# Patient Record
Sex: MALE | Race: OTHER | NOT HISPANIC OR LATINO | URBAN - METROPOLITAN AREA
[De-identification: names, ages, dates, MRNs, and addresses within clinical notes are randomized per-mention and may not be internally consistent; named-entity substitution may affect disease eponyms.]

---

## 2024-11-20 ENCOUNTER — OFFICE VISIT (OUTPATIENT)
Dept: URGENT CARE | Age: 59
End: 2024-11-20
Payer: MEDICAID

## 2024-11-20 VITALS
HEIGHT: 69 IN | OXYGEN SATURATION: 99 % | SYSTOLIC BLOOD PRESSURE: 138 MMHG | DIASTOLIC BLOOD PRESSURE: 75 MMHG | HEART RATE: 78 BPM | TEMPERATURE: 98 F | BODY MASS INDEX: 23.4 KG/M2 | RESPIRATION RATE: 20 BRPM | WEIGHT: 158 LBS

## 2024-11-20 DIAGNOSIS — R51.9 INTRACTABLE HEADACHE, UNSPECIFIED CHRONICITY PATTERN, UNSPECIFIED HEADACHE TYPE: Primary | ICD-10-CM

## 2024-11-20 ASSESSMENT — PAIN SCALES - GENERAL: PAINLEVEL_OUTOF10: 4

## 2024-11-20 ASSESSMENT — ENCOUNTER SYMPTOMS
NAUSEA: 1
HEADACHES: 1

## 2024-11-21 ENCOUNTER — APPOINTMENT (OUTPATIENT)
Dept: RADIOLOGY | Facility: HOSPITAL | Age: 59
End: 2024-11-21
Payer: MEDICAID

## 2024-11-21 ENCOUNTER — HOSPITAL ENCOUNTER (EMERGENCY)
Facility: HOSPITAL | Age: 59
Discharge: HOME | End: 2024-11-21
Payer: MEDICAID

## 2024-11-21 VITALS
WEIGHT: 158 LBS | TEMPERATURE: 98.1 F | DIASTOLIC BLOOD PRESSURE: 74 MMHG | HEART RATE: 71 BPM | RESPIRATION RATE: 17 BRPM | BODY MASS INDEX: 23.4 KG/M2 | SYSTOLIC BLOOD PRESSURE: 136 MMHG | OXYGEN SATURATION: 97 % | HEIGHT: 69 IN

## 2024-11-21 DIAGNOSIS — R44.8 FACIAL PRESSURE: ICD-10-CM

## 2024-11-21 DIAGNOSIS — R11.0 NAUSEA: ICD-10-CM

## 2024-11-21 DIAGNOSIS — G43.809 OTHER MIGRAINE WITHOUT STATUS MIGRAINOSUS, NOT INTRACTABLE: Primary | ICD-10-CM

## 2024-11-21 PROCEDURE — 96375 TX/PRO/DX INJ NEW DRUG ADDON: CPT

## 2024-11-21 PROCEDURE — 96361 HYDRATE IV INFUSION ADD-ON: CPT

## 2024-11-21 PROCEDURE — 99284 EMERGENCY DEPT VISIT MOD MDM: CPT | Mod: 25

## 2024-11-21 PROCEDURE — 96374 THER/PROPH/DIAG INJ IV PUSH: CPT

## 2024-11-21 PROCEDURE — 70450 CT HEAD/BRAIN W/O DYE: CPT | Performed by: RADIOLOGY

## 2024-11-21 PROCEDURE — 70450 CT HEAD/BRAIN W/O DYE: CPT

## 2024-11-21 PROCEDURE — 2500000004 HC RX 250 GENERAL PHARMACY W/ HCPCS (ALT 636 FOR OP/ED): Performed by: PHYSICIAN ASSISTANT

## 2024-11-21 RX ORDER — DIPHENHYDRAMINE HYDROCHLORIDE 50 MG/ML
25 INJECTION INTRAMUSCULAR; INTRAVENOUS ONCE
Status: COMPLETED | OUTPATIENT
Start: 2024-11-21 | End: 2024-11-21

## 2024-11-21 RX ORDER — METOCLOPRAMIDE HYDROCHLORIDE 5 MG/ML
10 INJECTION INTRAMUSCULAR; INTRAVENOUS ONCE
Status: COMPLETED | OUTPATIENT
Start: 2024-11-21 | End: 2024-11-21

## 2024-11-21 RX ORDER — KETOROLAC TROMETHAMINE 30 MG/ML
15 INJECTION, SOLUTION INTRAMUSCULAR; INTRAVENOUS ONCE
Status: COMPLETED | OUTPATIENT
Start: 2024-11-21 | End: 2024-11-21

## 2024-11-21 ASSESSMENT — PAIN - FUNCTIONAL ASSESSMENT
PAIN_FUNCTIONAL_ASSESSMENT: 0-10
PAIN_FUNCTIONAL_ASSESSMENT: 0-10

## 2024-11-21 ASSESSMENT — COLUMBIA-SUICIDE SEVERITY RATING SCALE - C-SSRS
2. HAVE YOU ACTUALLY HAD ANY THOUGHTS OF KILLING YOURSELF?: NO
1. IN THE PAST MONTH, HAVE YOU WISHED YOU WERE DEAD OR WISHED YOU COULD GO TO SLEEP AND NOT WAKE UP?: NO
6. HAVE YOU EVER DONE ANYTHING, STARTED TO DO ANYTHING, OR PREPARED TO DO ANYTHING TO END YOUR LIFE?: NO

## 2024-11-21 ASSESSMENT — LIFESTYLE VARIABLES
TOTAL SCORE: 0
HAVE PEOPLE ANNOYED YOU BY CRITICIZING YOUR DRINKING: NO
HAVE YOU EVER FELT YOU SHOULD CUT DOWN ON YOUR DRINKING: NO
EVER HAD A DRINK FIRST THING IN THE MORNING TO STEADY YOUR NERVES TO GET RID OF A HANGOVER: NO
EVER FELT BAD OR GUILTY ABOUT YOUR DRINKING: NO

## 2024-11-21 ASSESSMENT — PAIN DESCRIPTION - LOCATION: LOCATION: HEAD

## 2024-11-21 ASSESSMENT — PAIN SCALES - GENERAL
PAINLEVEL_OUTOF10: 6
PAINLEVEL_OUTOF10: 0 - NO PAIN
PAINLEVEL_OUTOF10: 0 - NO PAIN
PAINLEVEL_OUTOF10: 4
PAINLEVEL_OUTOF10: 6

## 2024-11-21 ASSESSMENT — PAIN DESCRIPTION - PAIN TYPE: TYPE: ACUTE PAIN

## 2024-11-21 ASSESSMENT — PAIN DESCRIPTION - DESCRIPTORS: DESCRIPTORS: ACHING

## 2024-11-21 NOTE — Clinical Note
Gaston Veliz was seen and treated in our emergency department on 11/21/2024.  He may return to work on 11/23/2024.  Patient may return to activities earlier if feeling improved.     If you have any questions or concerns, please don't hesitate to call.      Temo Nguyen PA-C

## 2024-11-21 NOTE — PROGRESS NOTES
"Subjective   Patient ID: Gaston Veliz is a 59 y.o. male. They present today with a chief complaint of Headache (X 4 days, nausea. ).    History of Present Illness  Gaston Veliz is a 59 y.o. male who presents to the clinic for bitemporal headache. Pt states the HA has been ongoing for the past five days. Pt states he has a hx ofmigraines. Pt states his normal regimen for HA is ibuprofen and the HA would resolve. Pt states he has taken his normal HA regimen with no relief. Pt denies worst HA of life. Pt endorses nausea and dry heaves with his AVALOS.  Pt denies any chest pain, sob, N/V at this time in clinic.             Past Medical History  Allergies as of 11/20/2024    (No Known Allergies)       (Not in a hospital admission)       No past medical history on file.    No past surgical history on file.         Review of Systems  Review of Systems   Gastrointestinal:  Positive for nausea.   Neurological:  Positive for headaches.   All other systems reviewed and are negative.                                 Objective    Vitals:    11/20/24 1145   BP: 138/75   BP Location: Left arm   Patient Position: Sitting   BP Cuff Size: Adult   Pulse: 78   Resp: 20   Temp: 36.7 °C (98 °F)   TempSrc: Oral   SpO2: 99%   Weight: 71.7 kg (158 lb)   Height: 1.753 m (5' 9\")     No LMP for male patient.    Physical Exam  Constitutional:       Appearance: Normal appearance.   HENT:      Right Ear: Tympanic membrane normal.      Left Ear: Tympanic membrane normal.      Nose:      Right Sinus: No maxillary sinus tenderness or frontal sinus tenderness.      Left Sinus: No maxillary sinus tenderness or frontal sinus tenderness.   Pulmonary:      Effort: Pulmonary effort is normal.      Breath sounds: Normal breath sounds.   Neurological:      General: No focal deficit present.      Mental Status: He is alert and oriented to person, place, and time. Mental status is at baseline.   Psychiatric:         Mood and Affect: Mood normal.         Behavior: " Behavior normal.         Procedures    Point of Care Test & Imaging Results from this visit  No results found for this visit on 11/20/24.   No results found.    Diagnostic study results (if any) were reviewed by JOSE Mckinney.    Assessment/Plan   Allergies, medications, history, and pertinent labs/EKGs/Imaging reviewed by JOSE Mckinney.     Medical Decision Making  Advised pt to go to ER for further eval. Concern for HA with no relief of normal regimen. Pt will go to closest ER for further eval of AVALOS. Pt will drive himself to ER. Updated attending Dr. Mathis.     Orders and Diagnoses  Diagnoses and all orders for this visit:  Intractable headache, unspecified chronicity pattern, unspecified headache type      Medical Admin Record      Patient disposition: ED    Electronically signed by JOSE Mckinney  8:22 PM

## 2024-11-21 NOTE — DISCHARGE INSTRUCTIONS
Thank you for allowing me to take care of you at [facility] Emergency Department.  I hope you are feeling better.  Please return to the ED if you have any new or worsening symptoms.  Otherwise follow-up with your primary doctor.  Temo Nguyen PA-C

## 2024-11-21 NOTE — ED NOTES
Patient presents to the ER or complaints of headache for several days.      PMHX:  No past medical history on file.     No Known Allergies      LABS: N/A    PLAN: CT AND MEDS                   Licha Barba RN  11/21/24 9334

## 2024-11-21 NOTE — ED PROVIDER NOTES
HPI   Chief Complaint   Patient presents with    Headache     Pt states that he has had a headache for four days and it will not go away. Was seen at urgent care yesterday for it       59-year-old male smokes and drinks daily but does not take any chronic medications presents to the emergency department for 6 days of a occipital headache pattern as well as facial pressure.  Patient reports she has had 1 migraine in the past very remotely that he thinks may have presented similar to this but it has been so long that he does not really recall the details.  States that went to urgent care and they tried to give him some medications without significant improvement.  With no significant relief prompted the visit to the emergency department with recommendation for head CT from the urgent care provider.  Denies fever, chills, loss of vision, visual disturbance, chest pain or shortness of breath, abdominal pain, change in bowel or bladder habits.              Patient History   No past medical history on file.  No past surgical history on file.  No family history on file.  Social History     Tobacco Use    Smoking status: Not on file    Smokeless tobacco: Not on file   Substance Use Topics    Alcohol use: Not on file    Drug use: Not on file       Physical Exam   ED Triage Vitals [11/21/24 0848]   Temperature Heart Rate Respirations BP   36.7 °C (98.1 °F) 74 18 164/81      Pulse Ox Temp Source Heart Rate Source Patient Position   100 % Temporal Monitor Sitting      BP Location FiO2 (%)     Left arm --       Physical Exam  Constitutional:       Comments: GENERAL APPEARANCE:  Well nourished.  Well developed.  No acute distress.  HEENT: Normocephalic. atraumatic.   NECK: Trachea midline.   CARDIOVASCULAR: Heart is regular rate and rhythm and without murmur.  RESPIRATORY:  Lungs are clear to auscultation bilaterally.  No increased respiratory effort.  No acute respiratory distress.  GASTROINESTIONAL: Soft, non-distended with  normal bowel sounds.  No tenderness to palpation.  No rebound, guarding or peritonitis.  No palpable or pulsatile masses.  MUSCULOSKELETAL: Moves extremities. No injury or obvious deformity.  NEUROLOGIC:  Alert and oriented.  No focal neurologic deficits.  No dysarthria, aphasia, disconjugate gaze, gaze preference, facial asymmetry, strength or sensory discrepancies of the upper or lower extremity, altered mental status, ataxic movements.  NIH assessment 0.  BEHAVIORAL:  Age appropriate and cooperative.  SKIN:  Clean and dry.           ED Course & MDM   ED Course as of 11/21/24 1050   Thu Nov 21, 2024   0902 Of note patient is some mildly elevated blood pressure however has headache and is in pain.  States he was taken off his blood pressure medication by his PCP some months ago because his blood pressure was back to normal he states. [RA]   1017 Reevaluated patient, headache much improved and will plan for home-going once CT results as long as they are unremarkable.  Patient agreeable. [RA]      ED Course User Index  [RA] Temo Nguyen PA-C         Diagnoses as of 11/21/24 1050   Other migraine without status migrainosus, not intractable   Nausea   Facial pressure                 No data recorded     Alfredo Coma Scale Score: 15 (11/21/24 0857 : Licha Barba RN)                           Medical Decision Making  59-year-old male seen in the ED for an evaluation of migraine headache ongoing for the last 6 days.  Due to consideration for emergent process including migraine, headache, intracranial mass, sinusitis amongst others a work-up is pursued.  CT negative.  Patient much improved after migraine cocktail.  Will be discharged good condition instructed to follow-up with a primary doctor for repeat evaluation next few days.  Return precautions discussed.  I discussed case and plan with attending physician, Dr. Driscoll .    This document was completed using voice recognition transcription software.  Please  excuse any errors of transcription including grammatical, punctuation and spelling errors.  Please contact me with any questions regarding this chart.    Risk  OTC drugs.        Procedure  Procedures     Temo Nguyen PA-C  11/21/24 1057

## 2024-12-30 ENCOUNTER — LAB (OUTPATIENT)
Dept: LAB | Facility: LAB | Age: 59
End: 2024-12-30
Payer: MEDICAID

## 2024-12-30 DIAGNOSIS — E78.5 HYPERLIPIDEMIA, UNSPECIFIED: Primary | ICD-10-CM

## 2024-12-30 LAB
ALBUMIN SERPL BCP-MCNC: 4.3 G/DL (ref 3.4–5)
ALP SERPL-CCNC: 53 U/L (ref 33–120)
ALT SERPL W P-5'-P-CCNC: 26 U/L (ref 10–52)
ANION GAP SERPL CALCULATED.3IONS-SCNC: 14 MMOL/L (ref 10–20)
AST SERPL W P-5'-P-CCNC: 43 U/L (ref 9–39)
BILIRUB SERPL-MCNC: 0.6 MG/DL (ref 0–1.2)
BUN SERPL-MCNC: 13 MG/DL (ref 6–23)
CALCIUM SERPL-MCNC: 9.5 MG/DL (ref 8.6–10.3)
CHLORIDE SERPL-SCNC: 108 MMOL/L (ref 98–107)
CO2 SERPL-SCNC: 27 MMOL/L (ref 21–32)
CREAT SERPL-MCNC: 1.33 MG/DL (ref 0.5–1.3)
EGFRCR SERPLBLD CKD-EPI 2021: 62 ML/MIN/1.73M*2
ERYTHROCYTE [DISTWIDTH] IN BLOOD BY AUTOMATED COUNT: 15.8 % (ref 11.5–14.5)
GLUCOSE SERPL-MCNC: 104 MG/DL (ref 74–99)
HCT VFR BLD AUTO: 54.8 % (ref 41–52)
HGB BLD-MCNC: 18.3 G/DL (ref 13.5–17.5)
MCH RBC QN AUTO: 31.1 PG (ref 26–34)
MCHC RBC AUTO-ENTMCNC: 33.4 G/DL (ref 32–36)
MCV RBC AUTO: 93 FL (ref 80–100)
NRBC BLD-RTO: 0 /100 WBCS (ref 0–0)
PLATELET # BLD AUTO: 273 X10*3/UL (ref 150–450)
POTASSIUM SERPL-SCNC: 4.5 MMOL/L (ref 3.5–5.3)
PROT SERPL-MCNC: 7.5 G/DL (ref 6.4–8.2)
RBC # BLD AUTO: 5.88 X10*6/UL (ref 4.5–5.9)
SODIUM SERPL-SCNC: 144 MMOL/L (ref 136–145)
WBC # BLD AUTO: 7.3 X10*3/UL (ref 4.4–11.3)

## 2024-12-30 PROCEDURE — 80053 COMPREHEN METABOLIC PANEL: CPT

## 2024-12-30 PROCEDURE — 85027 COMPLETE CBC AUTOMATED: CPT

## 2025-01-08 ENCOUNTER — APPOINTMENT (OUTPATIENT)
Dept: RADIOLOGY | Facility: HOSPITAL | Age: 60
End: 2025-01-08
Payer: MEDICAID

## 2025-01-10 ENCOUNTER — HOSPITAL ENCOUNTER (OUTPATIENT)
Dept: RADIOLOGY | Facility: HOSPITAL | Age: 60
Discharge: HOME | End: 2025-01-10
Payer: MEDICAID

## 2025-01-10 DIAGNOSIS — R11.0 NAUSEA: ICD-10-CM

## 2025-01-10 DIAGNOSIS — R10.13 EPIGASTRIC PAIN: ICD-10-CM

## 2025-01-10 PROCEDURE — 74177 CT ABD & PELVIS W/CONTRAST: CPT

## 2025-01-10 PROCEDURE — 2550000001 HC RX 255 CONTRASTS: Performed by: FAMILY MEDICINE

## 2025-01-10 RX ADMIN — IOHEXOL 75 ML: 350 INJECTION, SOLUTION INTRAVENOUS at 10:56

## 2025-07-20 ENCOUNTER — APPOINTMENT (OUTPATIENT)
Dept: RADIOLOGY | Facility: HOSPITAL | Age: 60
End: 2025-07-20
Payer: MEDICAID

## 2025-07-20 ENCOUNTER — HOSPITAL ENCOUNTER (EMERGENCY)
Facility: HOSPITAL | Age: 60
Discharge: HOME | End: 2025-07-20
Attending: STUDENT IN AN ORGANIZED HEALTH CARE EDUCATION/TRAINING PROGRAM
Payer: MEDICAID

## 2025-07-20 ENCOUNTER — APPOINTMENT (OUTPATIENT)
Dept: CARDIOLOGY | Facility: HOSPITAL | Age: 60
End: 2025-07-20
Payer: MEDICAID

## 2025-07-20 VITALS
HEART RATE: 91 BPM | SYSTOLIC BLOOD PRESSURE: 172 MMHG | WEIGHT: 140 LBS | BODY MASS INDEX: 20.73 KG/M2 | HEIGHT: 69 IN | RESPIRATION RATE: 20 BRPM | TEMPERATURE: 98.2 F | DIASTOLIC BLOOD PRESSURE: 94 MMHG | OXYGEN SATURATION: 99 %

## 2025-07-20 DIAGNOSIS — R93.89 ABNORMAL CT SCAN: ICD-10-CM

## 2025-07-20 DIAGNOSIS — J01.90 ACUTE NON-RECURRENT SINUSITIS, UNSPECIFIED LOCATION: ICD-10-CM

## 2025-07-20 DIAGNOSIS — R42 LIGHTHEADED: ICD-10-CM

## 2025-07-20 DIAGNOSIS — R07.9 CHEST PAIN, UNSPECIFIED TYPE: Primary | ICD-10-CM

## 2025-07-20 DIAGNOSIS — R20.2 PARESTHESIA: ICD-10-CM

## 2025-07-20 LAB
ALBUMIN SERPL BCP-MCNC: 4.2 G/DL (ref 3.4–5)
ALP SERPL-CCNC: 44 U/L (ref 33–120)
ALT SERPL W P-5'-P-CCNC: 24 U/L (ref 10–52)
ANION GAP SERPL CALCULATED.3IONS-SCNC: 13 MMOL/L (ref 10–20)
AST SERPL W P-5'-P-CCNC: 20 U/L (ref 9–39)
BASOPHILS # BLD AUTO: 0.05 X10*3/UL (ref 0–0.1)
BASOPHILS NFR BLD AUTO: 0.6 %
BILIRUB SERPL-MCNC: 0.4 MG/DL (ref 0–1.2)
BUN SERPL-MCNC: 11 MG/DL (ref 6–23)
CALCIUM SERPL-MCNC: 9.4 MG/DL (ref 8.6–10.3)
CARDIAC TROPONIN I PNL SERPL HS: 4 NG/L (ref 0–20)
CARDIAC TROPONIN I PNL SERPL HS: 4 NG/L (ref 0–20)
CHLORIDE SERPL-SCNC: 107 MMOL/L (ref 98–107)
CO2 SERPL-SCNC: 25 MMOL/L (ref 21–32)
CREAT SERPL-MCNC: 1.03 MG/DL (ref 0.5–1.3)
EGFRCR SERPLBLD CKD-EPI 2021: 84 ML/MIN/1.73M*2
EOSINOPHIL # BLD AUTO: 0.16 X10*3/UL (ref 0–0.7)
EOSINOPHIL NFR BLD AUTO: 2 %
ERYTHROCYTE [DISTWIDTH] IN BLOOD BY AUTOMATED COUNT: 12.8 % (ref 11.5–14.5)
GLUCOSE SERPL-MCNC: 89 MG/DL (ref 74–99)
HCT VFR BLD AUTO: 51.6 % (ref 41–52)
HGB BLD-MCNC: 16.9 G/DL (ref 13.5–17.5)
IMM GRANULOCYTES # BLD AUTO: 0.03 X10*3/UL (ref 0–0.7)
IMM GRANULOCYTES NFR BLD AUTO: 0.4 % (ref 0–0.9)
LIPASE SERPL-CCNC: 65 U/L (ref 9–82)
LYMPHOCYTES # BLD AUTO: 2.25 X10*3/UL (ref 1.2–4.8)
LYMPHOCYTES NFR BLD AUTO: 28.8 %
MAGNESIUM SERPL-MCNC: 2.13 MG/DL (ref 1.6–2.4)
MCH RBC QN AUTO: 31.1 PG (ref 26–34)
MCHC RBC AUTO-ENTMCNC: 32.8 G/DL (ref 32–36)
MCV RBC AUTO: 95 FL (ref 80–100)
MONOCYTES # BLD AUTO: 0.7 X10*3/UL (ref 0.1–1)
MONOCYTES NFR BLD AUTO: 9 %
NEUTROPHILS # BLD AUTO: 4.63 X10*3/UL (ref 1.2–7.7)
NEUTROPHILS NFR BLD AUTO: 59.2 %
NRBC BLD-RTO: 0 /100 WBCS (ref 0–0)
PLATELET # BLD AUTO: 271 X10*3/UL (ref 150–450)
POTASSIUM SERPL-SCNC: 3.7 MMOL/L (ref 3.5–5.3)
PROT SERPL-MCNC: 7.2 G/DL (ref 6.4–8.2)
RBC # BLD AUTO: 5.44 X10*6/UL (ref 4.5–5.9)
SODIUM SERPL-SCNC: 141 MMOL/L (ref 136–145)
WBC # BLD AUTO: 7.8 X10*3/UL (ref 4.4–11.3)

## 2025-07-20 PROCEDURE — 85025 COMPLETE CBC W/AUTO DIFF WBC: CPT | Performed by: STUDENT IN AN ORGANIZED HEALTH CARE EDUCATION/TRAINING PROGRAM

## 2025-07-20 PROCEDURE — 36415 COLL VENOUS BLD VENIPUNCTURE: CPT | Performed by: STUDENT IN AN ORGANIZED HEALTH CARE EDUCATION/TRAINING PROGRAM

## 2025-07-20 PROCEDURE — 93005 ELECTROCARDIOGRAM TRACING: CPT

## 2025-07-20 PROCEDURE — 83690 ASSAY OF LIPASE: CPT | Performed by: CLINICAL NURSE SPECIALIST

## 2025-07-20 PROCEDURE — 80053 COMPREHEN METABOLIC PANEL: CPT | Performed by: STUDENT IN AN ORGANIZED HEALTH CARE EDUCATION/TRAINING PROGRAM

## 2025-07-20 PROCEDURE — 70498 CT ANGIOGRAPHY NECK: CPT

## 2025-07-20 PROCEDURE — 70496 CT ANGIOGRAPHY HEAD: CPT | Performed by: RADIOLOGY

## 2025-07-20 PROCEDURE — 71046 X-RAY EXAM CHEST 2 VIEWS: CPT | Performed by: RADIOLOGY

## 2025-07-20 PROCEDURE — 2500000001 HC RX 250 WO HCPCS SELF ADMINISTERED DRUGS (ALT 637 FOR MEDICARE OP): Performed by: CLINICAL NURSE SPECIALIST

## 2025-07-20 PROCEDURE — 99285 EMERGENCY DEPT VISIT HI MDM: CPT | Mod: 25 | Performed by: STUDENT IN AN ORGANIZED HEALTH CARE EDUCATION/TRAINING PROGRAM

## 2025-07-20 PROCEDURE — 2550000001 HC RX 255 CONTRASTS: Performed by: STUDENT IN AN ORGANIZED HEALTH CARE EDUCATION/TRAINING PROGRAM

## 2025-07-20 PROCEDURE — 71046 X-RAY EXAM CHEST 2 VIEWS: CPT

## 2025-07-20 PROCEDURE — 84484 ASSAY OF TROPONIN QUANT: CPT | Performed by: STUDENT IN AN ORGANIZED HEALTH CARE EDUCATION/TRAINING PROGRAM

## 2025-07-20 PROCEDURE — 70498 CT ANGIOGRAPHY NECK: CPT | Performed by: RADIOLOGY

## 2025-07-20 PROCEDURE — 83735 ASSAY OF MAGNESIUM: CPT | Performed by: CLINICAL NURSE SPECIALIST

## 2025-07-20 RX ORDER — NAPROXEN SODIUM 220 MG/1
324 TABLET, FILM COATED ORAL ONCE
Status: COMPLETED | OUTPATIENT
Start: 2025-07-20 | End: 2025-07-20

## 2025-07-20 RX ORDER — FLUTICASONE PROPIONATE 50 MCG
1 SPRAY, SUSPENSION (ML) NASAL DAILY
Qty: 16 G | Refills: 0 | Status: SHIPPED | OUTPATIENT
Start: 2025-07-20 | End: 2025-08-03

## 2025-07-20 RX ADMIN — ASPIRIN 324 MG: 81 TABLET, CHEWABLE ORAL at 08:35

## 2025-07-20 RX ADMIN — IOHEXOL 50 ML: 350 INJECTION, SOLUTION INTRAVENOUS at 08:46

## 2025-07-20 RX ADMIN — IOHEXOL 50 ML: 350 INJECTION, SOLUTION INTRAVENOUS at 08:59

## 2025-07-20 ASSESSMENT — HEART SCORE
HEART SCORE: 2
TROPONIN: LESS THAN OR EQUAL TO NORMAL LIMIT
RISK FACTORS: 1-2 RISK FACTORS
HISTORY: SLIGHTLY SUSPICIOUS
AGE: 45-64
ECG: NORMAL

## 2025-07-20 ASSESSMENT — PAIN DESCRIPTION - PAIN TYPE: TYPE: ACUTE PAIN

## 2025-07-20 ASSESSMENT — LIFESTYLE VARIABLES
HAVE PEOPLE ANNOYED YOU BY CRITICIZING YOUR DRINKING: NO
EVER HAD A DRINK FIRST THING IN THE MORNING TO STEADY YOUR NERVES TO GET RID OF A HANGOVER: NO
HAVE YOU EVER FELT YOU SHOULD CUT DOWN ON YOUR DRINKING: NO

## 2025-07-20 ASSESSMENT — PAIN DESCRIPTION - LOCATION: LOCATION: CHEST

## 2025-07-20 ASSESSMENT — PAIN - FUNCTIONAL ASSESSMENT: PAIN_FUNCTIONAL_ASSESSMENT: 0-10

## 2025-07-20 ASSESSMENT — PAIN SCALES - GENERAL: PAINLEVEL_OUTOF10: 3

## 2025-07-20 ASSESSMENT — PAIN DESCRIPTION - FREQUENCY: FREQUENCY: CONSTANT/CONTINUOUS

## 2025-07-20 NOTE — ED PROVIDER NOTES
Supervisory note:  Patient seen in conjunction with Anum Kauffman NP.  Patient presents with chest heaviness/pressure which has been intermittent since Wednesday.  It is been coming and going.  No obvious exacerbating or relieving factors.  It has often happen while he is laying down and relaxing.  He reports history of high blood pressure and smokes cigarettes.  He does drink alcohol regularly although stopped drinking 5 days ago.  He is otherwise healthy.  On examination, heart and lung sounds are unremarkable.  2+ radial pulses bilaterally.  2+ posterior tibial pulses bilaterally.  No pretibial edema.    Laboratory studies are unremarkable.  Chest x-ray is without acute findings.  CT angio of the head and neck obtained given his reports of paresthesias in his left arm.  This is unremarkable.  Patient is known to be at low risk for major adverse cardiac event in the neck 6 weeks by heart score.  PE, aortic dissection,/perforation, pneumothorax felt to be very unlikely.  Patient given referral to follow-up with cardiology.  Return precautions given for any worsening symptoms.  I personally saw the patient and made/approved the management plan and take responsiblity for the patient management.  Parts of this chart were completed with dictation software, please excuse any errors in transcription.     Lincoln Segovia MD  07/20/25 1240

## 2025-07-20 NOTE — DISCHARGE INSTRUCTIONS
Follow-up with primary care physician in 2 days for reevaluation.  Follow-up with cardiology  Return with any worsening symptoms or concerns.  Try scheduling Tylenol to help with pain.  No driving till cleared by primary care physician.  Today it was noted that your blood pressure was elevated follow-up with primary care physician for reevaluation.  Journal your blood pressure daily  Incidental findings discussed with you on CT follow-up with primary care physician for reevaluation  CT showed concerns for sinusitis.  Supportive care measures at home.  Warm compresses to the face.  Flonase over-the-counter.  Coolmist vaporizer at home.  Monitor for signs and symptoms of infection

## 2025-07-20 NOTE — Clinical Note
Gaston Veliz was seen and treated in our emergency department on 7/20/2025.  He may return to work on 07/22/2025.  1-3 days no driving until cleared by primary care physician or specialty team     If you have any questions or concerns, please don't hesitate to call.      Lincoln Segovia MD

## 2025-07-20 NOTE — ED PROVIDER NOTES
Department of Emergency Medicine   ED  Provider Note  Admit Date/RoomTime: 7/20/2025  7:03 AM  ED Room: [facility]        History of Present Illness:  Chief Complaint   Patient presents with    Chest Pain     Pt comes in with a chest pain that started last Wednesday. Pt says he was sitting when the pain started and it felt like someone was sitting on his chest. He says along with chest pain he's had a feeling of nausea and dizziness. He says the pain is intermittent and starts at random times. Pt is not having any SOB and says he has no cardiac hx. Pt has no other complaint at this time.          Gaston Veliz is a 60 y.o. male denies chronic medical illnesses presents to the emergency department with complaints of chest heaviness.  Described as elephant sitting on his chest.  Associated symptoms of nausea and dizziness described as feeling lightheaded.  Denies room spinning.  No difficulty ambulating.  Reports he does not feel like he is going to pass out.  Denies any fall or injury.  No shortness of breath.  No increased leg pain no increased swelling of the lower extremities.  Patient reports the pain was present when he woke up this morning at 3 AM his normal time of awakening.  He has had pain on and off since Wednesday 5 days ago.  He also noticed today that he had some numbness in his left arm from the elbow to the little finger after having his arm above his head.  However it did not resolve when he repositioned and took a hot shower.  He denies any difficulty with speech.  No difficulty moving his extremities.  Denies any cough congestion reports when he feels pressure on his chest he does feel congested.  No wheezing he is a smoker no family history of heart disease.  Does drink alcohol daily last drink was 5 days ago.  Patient reports a couple days ago he had some abdominal discomfort which stopped yesterday.    Review of Systems:   Pertinent positives and negatives are stated within HPI, all other systems  reviewed and are negative.        --------------------------------------------- PAST HISTORY ---------------------------------------------  Past Medical History:  has no past medical history on file.  Past Surgical History:  has no past surgical history on file.  Social History:    Family History: family history is not on file.. Unless otherwise noted, family history is non contributory  The patient’s home medications have been reviewed.  Allergies: Patient has no known allergies.        ---------------------------------------------------PHYSICAL EXAM--------------------------------------    GENERAL APPEARANCE: Awake and alert.   VITAL SIGNS: As per the nurses' triage record.  Elevated blood pressure  HEENT: Normocephalic, atraumatic. Extraocular muscles are intact. Pupils equal round and reactive to light. Conjunctiva are pink. Negative scleral icterus. Mucous membranes are moist. Tongue in the midline. Pharynx was without erythema or exudates, uvula midline  NECK: Soft Nontender and supple, full gross ROM, no meningeal signs.  CHEST: Nontender to palpation. Clear to auscultation bilaterally. No rales, rhonchi, or wheezing.   HEART: S1, S2. Regular rate and rhythm. No murmurs, gallops or rubs.  Strong and equal pulses in the extremities.   ABDOMEN: Soft, nontender, nondistended, positive bowel sounds, no palpable masses.  MUSCULCSKELETAL: The calves are nontender to palpation. Full gross active range of motion.   NEUROLOGICAL: Awake, alert and oriented x 3. Power intact in the upper and lower extremities. Sensation is intact to light touch in the upper and lower extremities.  NIH 0.  Test of skew negative Van negative.  Moving all extremities per normal.  Speech is clear facial movement symmetrical.  IMMUNOLOGICAL: No lymphatic streaking noted   DERM: No petechiae, rashes, or ecchymoses.          ------------------------- NURSING NOTES AND VITALS REVIEWED ---------------------------  The nursing notes within the ED  "encounter and vital signs as below have been reviewed by myself  BP (!) 172/94 (BP Location: Left arm, Patient Position: Sitting)   Pulse 91   Temp 36.8 °C (98.2 °F) (Tympanic)   Resp 20   Ht 1.753 m (5' 9\")   Wt 63.5 kg (140 lb)   SpO2 99%   BMI 20.67 kg/m²     Oxygen Saturation Interpretation: 97% room air    The cardiac monitor revealed sinus rhythm with a heart rate in the 60s as interpreted by me. The cardiac monitor was ordered secondary to the patient's heart rate and to monitor the patient for dysrhythmia.       The patient’s available past medical records and past encounters were reviewed.          -----------------------DIAGNOSTIC RESULTS------------------------  LABS:    Labs Reviewed   COMPREHENSIVE METABOLIC PANEL - Normal       Result Value    Glucose 89      Sodium 141      Potassium 3.7      Chloride 107      Bicarbonate 25      Anion Gap 13      Urea Nitrogen 11      Creatinine 1.03      eGFR 84      Calcium 9.4      Albumin 4.2      Alkaline Phosphatase 44      Total Protein 7.2      AST 20      Bilirubin, Total 0.4      ALT 24     SERIAL TROPONIN-INITIAL - Normal    Troponin I, High Sensitivity 4      Narrative:     Less than 99th percentile of normal range cutoff-  Female and children under 18 years old <14 ng/L; Male <21 ng/L: Negative  Repeat testing should be performed if clinically indicated.     Female and children under 18 years old 14-50 ng/L; Male 21-50 ng/L:  Consistent with possible cardiac damage and possible increased clinical   risk. Serial measurements may help to assess extent of myocardial damage.     >50 ng/L: Consistent with cardiac damage, increased clinical risk and  myocardial infarction. Serial measurements may help assess extent of   myocardial damage.      NOTE: Children less than 1 year old may have higher baseline troponin   levels and results should be interpreted in conjunction with the overall   clinical context.     NOTE: Troponin I testing is performed using " a different   testing methodology at [facility] than at other   [facility]. Direct result comparisons should only   be made within the same method.   SERIAL TROPONIN, 1 HOUR - Normal    Troponin I, High Sensitivity 4      Narrative:     Less than 99th percentile of normal range cutoff-  Female and children under 18 years old <14 ng/L; Male <21 ng/L: Negative  Repeat testing should be performed if clinically indicated.     Female and children under 18 years old 14-50 ng/L; Male 21-50 ng/L:  Consistent with possible cardiac damage and possible increased clinical   risk. Serial measurements may help to assess extent of myocardial damage.     >50 ng/L: Consistent with cardiac damage, increased clinical risk and  myocardial infarction. Serial measurements may help assess extent of   myocardial damage.      NOTE: Children less than 1 year old may have higher baseline troponin   levels and results should be interpreted in conjunction with the overall   clinical context.     NOTE: Troponin I testing is performed using a different   testing methodology at [facility] than at [facility]. Direct result comparisons should only   be made within the same method.   MAGNESIUM - Normal    Magnesium 2.13     LIPASE - Normal    Lipase 65      Narrative:     Venipuncture immediately after or during the administration of Metamizole may lead to falsely low results. Testing should be performed immediately prior to Metamizole dosing.   CBC WITH AUTO DIFFERENTIAL    WBC 7.8      nRBC 0.0      RBC 5.44      Hemoglobin 16.9      Hematocrit 51.6      MCV 95      MCH 31.1      MCHC 32.8      RDW 12.8      Platelets 271      Neutrophils % 59.2      Immature Granulocytes %, Automated 0.4      Lymphocytes % 28.8      Monocytes % 9.0      Eosinophils % 2.0      Basophils % 0.6      Neutrophils Absolute 4.63      Immature Granulocytes Absolute, Automated 0.03      Lymphocytes Absolute 2.25       Monocytes Absolute 0.70      Eosinophils Absolute 0.16      Basophils Absolute 0.05     TROPONIN SERIES- (INITIAL, 1 HR)    Narrative:     The following orders were created for panel order Troponin I Series, High Sensitivity (0, 1 HR).  Procedure                               Abnormality         Status                     ---------                               -----------         ------                     Troponin I, High Sensiti...[[id number]]  Normal              Final result               Troponin, High Sensitivi...[[id number]]  Normal              Final result                 Please view results for these tests on the individual orders.       As interpreted by me, the above displayed labs are abnormal. All other labs obtained during this visit were within normal range or not returned as of this dictation.      EKG Interpretation  0657 EKG interpreted by me: Normal sinus rhythm, rate 87.  Normal axis.  LVH with likely repolarization abnormality.  P wave enlargement.  Diffuse ST depression felt to be secondary to repolarization abnormality. [ML]           CT angio head and neck w and wo IV contrast   Final Result   No evidence for significant stenosis or large branch vessel cutoffs   of the major intracranial or extracranial arterial vasculature. MRI   would be more sensitive and specific for recent ischemia if   clinically suspected.        Air mixed with mucoid debris right maxillary sinus-please correlate   clinically for sinusitis.        MACRO:   None        Signed by: Enmanuel Pedraza 7/20/2025 9:45 AM   Dictation workstation:   [id number]      XR chest 2 views   Final Result   Normal chest.        Signed by: Shira Brown 7/20/2025 8:17 AM   Dictation workstation:   [id number]              CT angio head and neck w and wo IV contrast   Final Result   No evidence for significant stenosis or large branch vessel cutoffs   of the major intracranial or extracranial arterial vasculature. MRI   would be more sensitive  and specific for recent ischemia if   clinically suspected.        Air mixed with mucoid debris right maxillary sinus-please correlate   clinically for sinusitis.        MACRO:   None        Signed by: Enmanuel Pedraza 7/20/2025 9:45 AM   Dictation workstation:   [id number]      XR chest 2 views   Final Result   Normal chest.        Signed by: Shira Brown 7/20/2025 8:17 AM   Dictation workstation:   [id number]              ------------------------------ ED COURSE/MEDICAL DECISION MAKING----------------------  Medical Decision Making:   Exam: A medically appropriate exam performed, outlined above, given the known history and presentation.    History obtained from: Review of medical record nursing notes patient      Social Determinants of Health considered during this visit: Takes care of himself at home      PAST MEDICAL HISTORY/Chronic Conditions Affecting Care     has no past medical history on file.       CC/HPI Summary, Social Determinants of health, Records Reviewed, DDx, testing done/not done, ED Course, Reassessment, disposition considerations/shared decision making with patient, consults, disposition:   Presents with chest heaviness described as an elephant sitting on his chest associated symptoms of lightheadedness and nausea.  Also numbness in the left arm from the elbow to the little finger  Plan  Magnesium,, EKG, CBC, CMP, lipase, troponin, cardiac monitoring    Medical Decision Making/Differential Diagnosis:  Differentials include but not limited to acute coronary syndrome versus electrolyte abnormality versus dehydration versus arrhythmia versus withdrawal versus nerve palsy of the arm.  Low suspicion for stroke NIH is 0 test of skew negative Van negative.  Onset of symptoms of numbness in the left arm along the lateral aspect to the little finger started at 4 AM patient is not a candidate for TNK window.  NIH 0  Review  Magnesium 2.3  Glucose 89  Electrolytes unremarkable  Normal renal function  Normal  LFTs  Troponin 4  Lipase 65  White blood cell count 7.8  Hemoglobin 16.9  Patient presented to the emergency department complaints of chest pain described as heaviness on his chest onset of symptoms 5 days.  Today also had some tingling numbness in his left arm from the elbow to the little finger.  NIH is 0.  Test of skew negative Van negative.  Low suspicion for stroke and TIA.  Patient is ambulatory.  CT of the head showed No evidence for significant stenosis or large branch vessel cutoffs of the major intracranial or extracranial arterial vasculature. Air mixed with mucoid debris right maxillary sinus-please correlate  clinically for sinusitis this could be contributing to patient's dizziness feeling off balance.  With given Flonase supportive care measures at home.  No bacterial signs of infection at this time.  No signs of sepsis.  Electrolytes unremarkable.  Normal renal function.  Normal LFTs.  Lipase within normal limits.  Magnesium within normal limits no elevation white blood cell count patient is not anemic.  EKG per attending note normal sinus rhythm no ST elevation or arrhythmia noted.  Diffuse ST depression was noted.  Troponin 4 repeat troponin 4.  Patient continues to have no chest pain here in the emergency department.  Patient is recently stopped drinking alcohol and has been sober now for several days with no signs of DTs.  Heart score of 2 patient given referral to cardiology.  Aspirin.  Close follow-up with primary care.  Numbness and tingling to the left arm from the elbow to the finger concerning for possible ulnar nerve palsy versus inflammation.  No sign of a septic joint.  Versus distal nerve root injury.  No signs of cauda equina syndrome.  No loss of bowel or bladder control.  Epidural abscess he is afebrile.  No report of IV drug use.  Patient seen and evaluated with attending physician Dr. Juliette MURPHY for discharge utilized discharge planning blood pressure to be elevated advise close  "follow-up with primary care.  Patient seen and evaluated with attending physician Dr. Segovia no signs of sepsis or toxicity upon discharge she is not hypotensive tachycardic or febrile.     I utilized an evidence-based risk rating tool (CMT) along with my training and experience to weigh the risk of discharge against the risks of further testing, imaging, or hospitalization. At this time I estimate the risks of additional testing, imaging, or hospitalization to be equal to or greater than the risk of discharge. I discussed my risk assessment with the patient and the patient consents to the risk of discharge as well as the risk of uncertainty in estimating outcomes.    Upon reevaluation, patient remains chest pain-free.  Reviewed laboratory data and test results with him also incidental findings discussed blood pressure he has close follow-up with his primary care is also cardiology, with any worsening symptoms or concerns advised on signs and symptoms to monitor for at home  CMT for chest pain utilized for discharge as well.  Patient provided with cardiology referral plan for discharge patient verbalizes understanding amenable to plan  The patient's HEART Score is <4. In rare cases, I give patients with HEART Score of 4 the option of discharge, but only when they meet criteria for \"Low 4,\" meaning that HST was used, and the 4 is not from a highly suspicious story, highly suspicious EKG, or positive cardiac enzymes. In these selected cases, the risk of a \"Low 4\" is still most likely lower than the risk of admission and further testing/imaging. [id number]       PROCEDURES  Unless otherwise noted below, none      CONSULTS:   None      ED Course as of 07/20/25 1216   Sun Jul 20, 2025   0657 EKG interpreted by me: Normal sinus rhythm, rate 87.  Normal axis.  LVH with likely repolarization abnormality.  P wave enlargement.  Diffuse ST depression felt to be secondary to repolarization abnormality. [ML]      ED " Course User Index  [ML] Lincoln Segovia MD         Diagnoses as of 07/20/25 1216   Chest pain, unspecified type   Paresthesia   Lightheaded   Abnormal CT scan   Acute non-recurrent sinusitis, unspecified location         This patient has remained hemodynamically stable during their ED course.      Critical Care: none        Counseling:  The emergency provider has spoken with the patient and discussed today’s results, in addition to providing specific details for the plan of care and counseling regarding the diagnosis and prognosis.  Questions are answered at this time and they are agreeable with the plan.         --------------------------------- IMPRESSION AND DISPOSITION ---------------------------------    IMPRESSION  1. Chest pain, unspecified type    2. Paresthesia    3. Lightheaded    4. Abnormal CT scan    5. Acute non-recurrent sinusitis, unspecified location        DISPOSITION  Disposition: Discharge home  Patient condition is improved with ER interventions        NOTE: This report was transcribed using voice recognition software. Every effort was made to ensure accuracy; however, inadvertent computerized transcription errors may be present      JOSE Day  07/20/25 1217       JOSE Day  07/20/25 1217

## 2025-07-22 LAB
ATRIAL RATE: 87 BPM
P AXIS: 87 DEGREES
P OFFSET: 201 MS
P ONSET: 158 MS
PR INTERVAL: 128 MS
Q ONSET: 222 MS
QRS COUNT: 15 BEATS
QRS DURATION: 78 MS
QT INTERVAL: 346 MS
QTC CALCULATION(BAZETT): 416 MS
QTC FREDERICIA: 391 MS
R AXIS: 74 DEGREES
T AXIS: 77 DEGREES
T OFFSET: 395 MS
VENTRICULAR RATE: 87 BPM

## 2025-08-19 ENCOUNTER — OFFICE VISIT (OUTPATIENT)
Dept: CARDIOLOGY | Facility: HOSPITAL | Age: 60
End: 2025-08-19
Payer: MEDICAID

## 2025-08-19 VITALS
SYSTOLIC BLOOD PRESSURE: 179 MMHG | DIASTOLIC BLOOD PRESSURE: 114 MMHG | HEART RATE: 98 BPM | BODY MASS INDEX: 22.14 KG/M2 | WEIGHT: 149.91 LBS | OXYGEN SATURATION: 99 %

## 2025-08-19 DIAGNOSIS — E78.2 MIXED HYPERLIPIDEMIA: ICD-10-CM

## 2025-08-19 DIAGNOSIS — R07.89 OTHER CHEST PAIN: Primary | ICD-10-CM

## 2025-08-19 DIAGNOSIS — I10 ESSENTIAL (PRIMARY) HYPERTENSION: ICD-10-CM

## 2025-08-19 PROCEDURE — 3077F SYST BP >= 140 MM HG: CPT | Performed by: NURSE PRACTITIONER

## 2025-08-19 PROCEDURE — 3080F DIAST BP >= 90 MM HG: CPT | Performed by: NURSE PRACTITIONER

## 2025-08-19 PROCEDURE — 99212 OFFICE O/P EST SF 10 MIN: CPT

## 2025-08-19 PROCEDURE — 99204 OFFICE O/P NEW MOD 45 MIN: CPT | Performed by: NURSE PRACTITIONER

## 2025-08-19 RX ORDER — AMLODIPINE BESYLATE 5 MG/1
5 TABLET ORAL DAILY
Qty: 90 TABLET | Refills: 3 | Status: SHIPPED | OUTPATIENT
Start: 2025-08-19 | End: 2026-08-19

## 2025-08-19 RX ORDER — ATORVASTATIN CALCIUM 20 MG/1
20 TABLET, FILM COATED ORAL DAILY
Qty: 90 TABLET | Refills: 3 | Status: SHIPPED | OUTPATIENT
Start: 2025-08-19 | End: 2026-08-19

## 2025-08-19 ASSESSMENT — ENCOUNTER SYMPTOMS: DIZZINESS: 1

## 2025-09-03 ENCOUNTER — APPOINTMENT (OUTPATIENT)
Dept: CARDIOLOGY | Facility: HOSPITAL | Age: 60
End: 2025-09-03
Payer: MEDICAID